# Patient Record
Sex: FEMALE | Race: WHITE | Employment: OTHER | URBAN - METROPOLITAN AREA
[De-identification: names, ages, dates, MRNs, and addresses within clinical notes are randomized per-mention and may not be internally consistent; named-entity substitution may affect disease eponyms.]

---

## 2019-05-05 ENCOUNTER — APPOINTMENT (OUTPATIENT)
Dept: GENERAL RADIOLOGY | Age: 84
DRG: 602 | End: 2019-05-05
Payer: MEDICARE

## 2019-05-05 ENCOUNTER — HOSPITAL ENCOUNTER (INPATIENT)
Age: 84
LOS: 3 days | Discharge: NURSING, REHAB OR LONG-TERM CARE | DRG: 602 | End: 2019-05-08
Attending: EMERGENCY MEDICINE | Admitting: INTERNAL MEDICINE
Payer: MEDICARE

## 2019-05-05 DIAGNOSIS — L03.115 CELLULITIS OF RIGHT LOWER EXTREMITY: Primary | ICD-10-CM

## 2019-05-05 DIAGNOSIS — L89.159 PRESSURE INJURY OF SKIN OF SACRAL REGION, UNSPECIFIED INJURY STAGE: ICD-10-CM

## 2019-05-05 PROBLEM — L03.90 CELLULITIS: Status: ACTIVE | Noted: 2019-05-05

## 2019-05-05 LAB
ANION GAP SERPL CALCULATED.3IONS-SCNC: 13 MMOL/L (ref 3–16)
BASOPHILS ABSOLUTE: 0 K/UL (ref 0–0.2)
BASOPHILS RELATIVE PERCENT: 0.3 %
BUN BLDV-MCNC: 25 MG/DL (ref 7–20)
C-REACTIVE PROTEIN: 68.2 MG/L (ref 0–5.1)
CALCIUM SERPL-MCNC: 8.8 MG/DL (ref 8.3–10.6)
CHLORIDE BLD-SCNC: 94 MMOL/L (ref 99–110)
CO2: 25 MMOL/L (ref 21–32)
CREAT SERPL-MCNC: 0.6 MG/DL (ref 0.6–1.2)
EOSINOPHILS ABSOLUTE: 0 K/UL (ref 0–0.6)
EOSINOPHILS RELATIVE PERCENT: 0.5 %
GFR AFRICAN AMERICAN: >60
GFR NON-AFRICAN AMERICAN: >60
GLUCOSE BLD-MCNC: 117 MG/DL (ref 70–99)
HCT VFR BLD CALC: 41.8 % (ref 36–48)
HEMOGLOBIN: 13.7 G/DL (ref 12–16)
LYMPHOCYTES ABSOLUTE: 0.7 K/UL (ref 1–5.1)
LYMPHOCYTES RELATIVE PERCENT: 7.8 %
MCH RBC QN AUTO: 30.5 PG (ref 26–34)
MCHC RBC AUTO-ENTMCNC: 32.7 G/DL (ref 31–36)
MCV RBC AUTO: 93.2 FL (ref 80–100)
MONOCYTES ABSOLUTE: 0.8 K/UL (ref 0–1.3)
MONOCYTES RELATIVE PERCENT: 8.7 %
NEUTROPHILS ABSOLUTE: 7.4 K/UL (ref 1.7–7.7)
NEUTROPHILS RELATIVE PERCENT: 82.7 %
PDW BLD-RTO: 14 % (ref 12.4–15.4)
PLATELET # BLD: 277 K/UL (ref 135–450)
PMV BLD AUTO: 8.6 FL (ref 5–10.5)
POTASSIUM REFLEX MAGNESIUM: 4.8 MMOL/L (ref 3.5–5.1)
RBC # BLD: 4.49 M/UL (ref 4–5.2)
SEDIMENTATION RATE, ERYTHROCYTE: 44 MM/HR (ref 0–30)
SODIUM BLD-SCNC: 132 MMOL/L (ref 136–145)
WBC # BLD: 9 K/UL (ref 4–11)

## 2019-05-05 PROCEDURE — 96366 THER/PROPH/DIAG IV INF ADDON: CPT

## 2019-05-05 PROCEDURE — 6360000002 HC RX W HCPCS: Performed by: PHYSICIAN ASSISTANT

## 2019-05-05 PROCEDURE — 99284 EMERGENCY DEPT VISIT MOD MDM: CPT

## 2019-05-05 PROCEDURE — 73590 X-RAY EXAM OF LOWER LEG: CPT

## 2019-05-05 PROCEDURE — 86140 C-REACTIVE PROTEIN: CPT

## 2019-05-05 PROCEDURE — 2580000003 HC RX 258: Performed by: PHYSICIAN ASSISTANT

## 2019-05-05 PROCEDURE — 85025 COMPLETE CBC W/AUTO DIFF WBC: CPT

## 2019-05-05 PROCEDURE — 87040 BLOOD CULTURE FOR BACTERIA: CPT

## 2019-05-05 PROCEDURE — 85652 RBC SED RATE AUTOMATED: CPT

## 2019-05-05 PROCEDURE — 2580000003 HC RX 258: Performed by: INTERNAL MEDICINE

## 2019-05-05 PROCEDURE — 1200000000 HC SEMI PRIVATE

## 2019-05-05 PROCEDURE — 96365 THER/PROPH/DIAG IV INF INIT: CPT

## 2019-05-05 PROCEDURE — 80048 BASIC METABOLIC PNL TOTAL CA: CPT

## 2019-05-05 PROCEDURE — 73650 X-RAY EXAM OF HEEL: CPT

## 2019-05-05 RX ORDER — ACETAMINOPHEN 325 MG/1
650 TABLET ORAL EVERY 4 HOURS PRN
Status: DISCONTINUED | OUTPATIENT
Start: 2019-05-05 | End: 2019-05-08 | Stop reason: HOSPADM

## 2019-05-05 RX ORDER — DOXAZOSIN MESYLATE 4 MG/1
4 TABLET ORAL NIGHTLY
Status: DISCONTINUED | OUTPATIENT
Start: 2019-05-05 | End: 2019-05-08 | Stop reason: HOSPADM

## 2019-05-05 RX ORDER — SODIUM CHLORIDE 9 MG/ML
INJECTION, SOLUTION INTRAVENOUS CONTINUOUS
Status: ACTIVE | OUTPATIENT
Start: 2019-05-05 | End: 2019-05-06

## 2019-05-05 RX ORDER — SODIUM CHLORIDE 0.9 % (FLUSH) 0.9 %
10 SYRINGE (ML) INJECTION EVERY 12 HOURS SCHEDULED
Status: DISCONTINUED | OUTPATIENT
Start: 2019-05-05 | End: 2019-05-08 | Stop reason: HOSPADM

## 2019-05-05 RX ORDER — ONDANSETRON 2 MG/ML
4 INJECTION INTRAMUSCULAR; INTRAVENOUS EVERY 6 HOURS PRN
Status: DISCONTINUED | OUTPATIENT
Start: 2019-05-05 | End: 2019-05-08 | Stop reason: HOSPADM

## 2019-05-05 RX ORDER — AMLODIPINE BESYLATE 5 MG/1
5 TABLET ORAL DAILY
Status: DISCONTINUED | OUTPATIENT
Start: 2019-05-06 | End: 2019-05-08 | Stop reason: HOSPADM

## 2019-05-05 RX ORDER — SODIUM CHLORIDE 0.9 % (FLUSH) 0.9 %
10 SYRINGE (ML) INJECTION PRN
Status: DISCONTINUED | OUTPATIENT
Start: 2019-05-05 | End: 2019-05-08 | Stop reason: HOSPADM

## 2019-05-05 RX ADMIN — SODIUM CHLORIDE: 9 INJECTION, SOLUTION INTRAVENOUS at 23:56

## 2019-05-05 RX ADMIN — Medication 10 ML: at 23:56

## 2019-05-05 RX ADMIN — DEXTROSE MONOHYDRATE 1 G: 5 INJECTION INTRAVENOUS at 20:30

## 2019-05-05 ASSESSMENT — PAIN DESCRIPTION - ORIENTATION: ORIENTATION: POSTERIOR

## 2019-05-05 ASSESSMENT — PAIN SCALES - GENERAL: PAINLEVEL_OUTOF10: 5

## 2019-05-05 ASSESSMENT — PAIN DESCRIPTION - LOCATION: LOCATION: COCCYX

## 2019-05-05 ASSESSMENT — PAIN DESCRIPTION - ONSET: ONSET: PROGRESSIVE

## 2019-05-05 ASSESSMENT — PAIN - FUNCTIONAL ASSESSMENT: PAIN_FUNCTIONAL_ASSESSMENT: PREVENTS OR INTERFERES WITH ALL ACTIVE AND SOME PASSIVE ACTIVITIES

## 2019-05-05 ASSESSMENT — PAIN DESCRIPTION - FREQUENCY: FREQUENCY: CONTINUOUS

## 2019-05-05 ASSESSMENT — PAIN DESCRIPTION - DESCRIPTORS: DESCRIPTORS: CONSTANT

## 2019-05-05 ASSESSMENT — PAIN DESCRIPTION - PROGRESSION: CLINICAL_PROGRESSION: GRADUALLY WORSENING

## 2019-05-05 ASSESSMENT — PAIN SCALES - WONG BAKER: WONGBAKER_NUMERICALRESPONSE: 4

## 2019-05-05 ASSESSMENT — PAIN DESCRIPTION - PAIN TYPE: TYPE: ACUTE PAIN

## 2019-05-05 NOTE — ED NOTES
Mint Green, Lavender, Yellow/Orange blood tubes and one set of blood cultures collected and sent to lab.         Victoriano Cristina RN  05/05/19 1940

## 2019-05-05 NOTE — ED NOTES
PT IS BEDRIDDEN AT [facility]. DAUGHTER NOTICED  TODAY THAT PT'S PRESSURE ULCERS HAVE BEEN GETTING WORSE. SORES NOTED TO BILATERAL FEET AND LEGS AND COCCYX.       Jana Fritz RN  05/05/19 [id number]

## 2019-05-05 NOTE — ED PROVIDER NOTES
[address] [facility] [address] ENCOUNTER          PHYSICIAN ASSISTANT NOTE       Date of evaluation: 5/5/2019    Chief Complaint     Wound Check    History of Present Illness     Karmen King is a 80 y.o. female who presents with chief complaint of wound check. Patient cannot provide much history at all, obtained via her daughter. Daughter reports that the patient has been completely bedridden for many years and she has been taking care of all of the wounds the patient has been developing. Pressure points have become increasingly worse as of late because the patient cannot move herself whatsoever. Reports that she has been using calamine lotion on the areas and wrapping them up carefully to attempt to avoid infection. States that 3 days ago the way into her buttocks, right ankle, and right lower leg have become increasingly more red and painful to the patient. There is spreading redness around all 3 of these areas. The daughter attempted to get the patient to come to the emergency department 3 days ago, although she declined. She was able to convince her to come to the emergency department today. Discussion was had with the daughter who states that she has been taking care of her mother for as long as she can remember. Recently, the daughters 2 children, both with bipolar disorder, as well as multiple grandchildren have recently moved in with her. She states that she has struggling to care for herself as well as all of the family members, including this patient. She states that she is not providing as good of care as she used to to this patient does not fear to her. She states that she feels as if this patient needs to be sitting quietly at this time in her life and she is not getting this at home anymore. She is willing to discuss nursing home options.       Review of Systems     Review of Systems   Unable to perform ROS: Other     - does not speak in full sentences to me - says nikolay when moving legs only    Past Medical, Surgical, Family, and Social History     She has a past medical history of Cancer ([address] Hypertension, and Osteoarthritis. She has a past surgical history that includes Hysterectomy; fracture surgery; and Upper gastrointestinal endoscopy (3/8/11). Her family history is not on file. She reports that she has never smoked. She has never used smokeless tobacco. She reports that she does not drink alcohol or use drugs. Medications     Previous Medications    AMLODIPINE (NORVASC) 5 MG TABLET    Take 5 mg by mouth daily    DOXAZOSIN (CARDURA) 4 MG TABLET    Take 4 mg by mouth nightly    LOPERAMIDE (IMODIUM) 2 MG CAPSULE    Take 2 mg by mouth 4 times daily as needed for Diarrhea       Allergies     She has No Known Allergies. Physical Exam     INITIAL VITALS: BP: 115/66,  , Pulse: 100, Resp: 18, SpO2: 99 %  Physical Exam   Constitutional:   Frail, elderly female   HENT:   Head: Normocephalic and atraumatic. Cheeks sunken in   Eyes: Pupils are equal, round, and reactive to light. Conjunctivae and EOM are normal.   Neck: Normal range of motion. Cardiovascular: Normal rate, regular rhythm and normal heart sounds. Exam reveals no friction rub. No murmur heard. Pulmonary/Chest: Effort normal and breath sounds normal. No stridor. No respiratory distress. Musculoskeletal:   Does not move extremities   Skin:   Likely stage III decubitus ulcer to the sacrum with surrounding erythema, no active drainage, no warmth; stage III ulceration to the left medial aspect of the tibia with surrounding erythema, mild drainage to bandage; skin ulceration to left heel with fluid collection and surrounding erythema; multiple other areas of pressure sores without current breakdown of the skin along all pressure points on the body   Nursing note and vitals reviewed.       Diagnostic Results     EKG   none    RADIOLOGY:  XR TIBIA FIBULA RIGHT (2 VIEWS)   Final Result      No acute fracture but diffuse Allergies, and Family Hx were reviewed. The patient was given the following medications:  Orders Placed This Encounter   Medications    ceFAZolin (ANCEF) 1 g in dextrose 5 % 50 mL IVPB (mini-bag)       CONSULTS:  Martha Cintron / JOSELUIS / Jones Tamar is a 80 y.o. female presenting with her daughter for concern of bed sores. Patient's coworkers from pressure areas and increasing over the past year. Daughter reports that 1 year ago he did have wound care coming to see thumb as well as home health nurse, although they no longer come. She has not seen her primary care physician in quite some time, since early 2018. The daughter has been trying to care for the patient's wounds at home, although at this time believes that she has no longer able to care for them on her own without help. On exam, this is a very frail appearing female. She has multiple bandages all over her body for pressure wounds. There are three main areas of concerning wounds. One area is located to the sacrum, likely stage III decubitus ulcer. There is surrounding erythema, no active drainage, no warmth noted. Another area of concern is located to the right medial tibia. This is also likely stage III ulceration with surrounding erythema, mild drainage on the bandage, and slight warmth to it. There is another area located to the right heel with a fluid collection, no surrounding erythema on this area. Due to concern for developing cellulitis, IV access established laboratory analysis was obtained. CBC without leukocytosis or anemia. BMP with sodium of 132, not over like joint abnormalities. ESR pending at this time. CRP elevated to 60.2. One set of blood cultures obtained, likely not septic at this time. X-rays were obtained of the tibia calcaneus for concern of developing osteomyelitis.   These revealed severe demineralization as well as muscular atrophy, no concerning findings for osteomyelitis at this time. IV antibiotics were initiated for concern of developing cellulitis secondary to multiple skin ulcers. Due to patient's frail state of health, multiple pressure wounds, and concern for cellulitis, she will require admission for further workup and likely nursing home placement/palliative care/home health. This patient was also evaluated by the attending physician. All care plans were discussed and agreed upon. Clinical Impression     1. Cellulitis of right lower extremity    2. Pressure injury of skin of sacral region, unspecified injury stage        Disposition     PATIENT REFERRED TO:  No follow-up provider specified.     DISCHARGE MEDICATIONS:  New Prescriptions    No medications on file       DISPOSITION Decision To Admit 05/05/2019 08:31:17 PM        Jasper Messinama  05/05/19 2037

## 2019-05-06 PROBLEM — E43 SEVERE MALNUTRITION (HCC): Chronic | Status: ACTIVE | Noted: 2019-05-06

## 2019-05-06 PROBLEM — E43 SEVERE MALNUTRITION (HCC): Status: ACTIVE | Noted: 2019-05-06

## 2019-05-06 LAB
ANION GAP SERPL CALCULATED.3IONS-SCNC: 11 MMOL/L (ref 3–16)
BASOPHILS ABSOLUTE: 0 K/UL (ref 0–0.2)
BASOPHILS RELATIVE PERCENT: 0.5 %
BUN BLDV-MCNC: 18 MG/DL (ref 7–20)
CALCIUM SERPL-MCNC: 8.6 MG/DL (ref 8.3–10.6)
CHLORIDE BLD-SCNC: 97 MMOL/L (ref 99–110)
CO2: 25 MMOL/L (ref 21–32)
CREAT SERPL-MCNC: <0.5 MG/DL (ref 0.6–1.2)
EOSINOPHILS ABSOLUTE: 0.1 K/UL (ref 0–0.6)
EOSINOPHILS RELATIVE PERCENT: 1.2 %
GFR AFRICAN AMERICAN: >60
GFR NON-AFRICAN AMERICAN: >60
GLUCOSE BLD-MCNC: 107 MG/DL (ref 70–99)
HCT VFR BLD CALC: 33.1 % (ref 36–48)
HEMOGLOBIN: 10.8 G/DL (ref 12–16)
LYMPHOCYTES ABSOLUTE: 0.7 K/UL (ref 1–5.1)
LYMPHOCYTES RELATIVE PERCENT: 8.5 %
MCH RBC QN AUTO: 30 PG (ref 26–34)
MCHC RBC AUTO-ENTMCNC: 32.6 G/DL (ref 31–36)
MCV RBC AUTO: 91.9 FL (ref 80–100)
MONOCYTES ABSOLUTE: 0.9 K/UL (ref 0–1.3)
MONOCYTES RELATIVE PERCENT: 10.1 %
NEUTROPHILS ABSOLUTE: 6.7 K/UL (ref 1.7–7.7)
NEUTROPHILS RELATIVE PERCENT: 79.7 %
PDW BLD-RTO: 13.8 % (ref 12.4–15.4)
PLATELET # BLD: 333 K/UL (ref 135–450)
PMV BLD AUTO: 8.8 FL (ref 5–10.5)
POTASSIUM REFLEX MAGNESIUM: 4.3 MMOL/L (ref 3.5–5.1)
RBC # BLD: 3.6 M/UL (ref 4–5.2)
SODIUM BLD-SCNC: 133 MMOL/L (ref 136–145)
WBC # BLD: 8.4 K/UL (ref 4–11)

## 2019-05-06 PROCEDURE — 6370000000 HC RX 637 (ALT 250 FOR IP): Performed by: INTERNAL MEDICINE

## 2019-05-06 PROCEDURE — 80048 BASIC METABOLIC PNL TOTAL CA: CPT

## 2019-05-06 PROCEDURE — 1200000000 HC SEMI PRIVATE

## 2019-05-06 PROCEDURE — 6360000002 HC RX W HCPCS: Performed by: INTERNAL MEDICINE

## 2019-05-06 PROCEDURE — 36415 COLL VENOUS BLD VENIPUNCTURE: CPT

## 2019-05-06 PROCEDURE — 92610 EVALUATE SWALLOWING FUNCTION: CPT

## 2019-05-06 PROCEDURE — 85025 COMPLETE CBC W/AUTO DIFF WBC: CPT

## 2019-05-06 PROCEDURE — 6370000000 HC RX 637 (ALT 250 FOR IP): Performed by: FAMILY MEDICINE

## 2019-05-06 PROCEDURE — 96366 THER/PROPH/DIAG IV INF ADDON: CPT

## 2019-05-06 PROCEDURE — 2580000003 HC RX 258: Performed by: INTERNAL MEDICINE

## 2019-05-06 RX ORDER — CASTOR OIL AND BALSAM, PERU 788; 87 MG/G; MG/G
OINTMENT TOPICAL 2 TIMES DAILY
Status: DISCONTINUED | OUTPATIENT
Start: 2019-05-06 | End: 2019-05-08 | Stop reason: HOSPADM

## 2019-05-06 RX ORDER — CHOLECALCIFEROL (VITAMIN D3) 125 MCG
5 CAPSULE ORAL DAILY
COMMUNITY

## 2019-05-06 RX ADMIN — CEFAZOLIN SODIUM 1 G: 1 POWDER, FOR SOLUTION INTRAMUSCULAR; INTRAVENOUS at 11:40

## 2019-05-06 RX ADMIN — DOXAZOSIN 4 MG: 4 TABLET ORAL at 20:38

## 2019-05-06 RX ADMIN — ENOXAPARIN SODIUM 30 MG: 30 INJECTION SUBCUTANEOUS at 08:49

## 2019-05-06 RX ADMIN — CEFAZOLIN SODIUM 1 G: 1 POWDER, FOR SOLUTION INTRAMUSCULAR; INTRAVENOUS at 20:38

## 2019-05-06 RX ADMIN — AMLODIPINE BESYLATE 5 MG: 5 TABLET ORAL at 08:49

## 2019-05-06 RX ADMIN — DOXAZOSIN 4 MG: 4 TABLET ORAL at 00:55

## 2019-05-06 RX ADMIN — CASTOR OIL AND BALSAM, PERU: 788; 87 OINTMENT TOPICAL at 20:38

## 2019-05-06 ASSESSMENT — PAIN SCALES - GENERAL
PAINLEVEL_OUTOF10: 0

## 2019-05-06 NOTE — CONSULTS
[facility]  Palliative Medicine Consultation Note      Date Of Admission:5/5/2019  Date of consult: 05/06/19  Seen by [facility] in the past:  No    Recommendations:      Discussed the patient with POA over the phone. The patient has been bed ridden for years and family has been having with taking care of her especially now that the POA has to take care of her Bipolar grandchildren  that are living with her now. POA would like for the patient to receive physical therapy in hopes to make her stronger and even ambulatory again as well as having her wound healed. POA would like to continue to have the patient be a Full Code as that was the wishes of her mother to have everything done even though the POA understands that having resuscitation measures may not have a good outcome. Informed the POA that if she does not improve after physical therapy it might be worth having additional help through [place] [facility] or outpatient palliative services. We also introduced the Idea of hospice that if she continues to decline she would likely qualify with hospice and could get some extra help at home. 1. Goals of Care/Advanced Care planning/Code status: Full  2. Pain:no pain at this time  3. SOB:No SOB   Disposition:SNF    Reason for Consult:         __x__ Goals of Care  __x__ Code Status Discussion/Advanced Care Planning   __x__ Psychosocial/Family Support  __x__ Symptom Management  _____ Other (Specify)    Requesting Physician: Dr. Tyron Avila:  Skin ulcers    History Obtained From:  family member - Daughter    History of Present Illness:       Patient is a 81 y/o female who present to the ED for multiple wounds on her body. Patient has been bed ridden for a while and has been taken care of by her daughter. Family has been trying to take  Care of fran skin break down and has improved for a while but has now seemed to have worsen, Patient in the morning did not have any complaints but is atraumatic  Nose: Nares normal. Septum midline. Mucosa normal. No drainage or sinus tenderness. Lungs: clear to auscultation bilaterally  Heart: regular rate and rhythm, S1, S2 normal, no murmur, click, rub or gallop  Extremities: extremities normal, atraumatic, no cyanosis or edema  Skin: multiple decubiltal ulcers  Neurologic: A&Ox1    Palliative Performance Scale:  60% ? Ambulation reduced; Significant disease; Can't do hobbies/housework; intake normal   or reduced; occasional assist; LOC full/confusion  50% ? Mainly sit/lie; Extensive disease; Can't do any work; Considerable assist; intake normal  Or reduced; LOC full/confusion  40% ? Mainly in bed; Extensive disease; Mainly assist; intake normal or reduced; occasional assist; LOC full/confusion  30% ? Bed Bound; Extensive disease; Total care; intake reduced; LOC full/confusion  20% ? Bed Bound; Extensive disease; Total care; intake minimal; Drowsy/coma  10% ? Bed Bound; Extensive disease; Total care; Mouth care only; Drowsy/coma  0 ?  Death    PPS: 30%    Vitals:    /73   Pulse 85   Temp 97.6 °F (36.4 °C) (Oral)   Resp 16   Ht 5' 6\" (1.676 m)   Wt 99 lb 10.4 oz (45.2 kg)   SpO2 94%   BMI 16.08 kg/m²     DATA:    CBC with Differential:    Lab Results   Component Value Date    WBC 8.4 05/06/2019    RBC 3.60 05/06/2019    HGB 10.8 05/06/2019    HCT 33.1 05/06/2019     05/06/2019    MCV 91.9 05/06/2019    MCH 30.0 05/06/2019    MCHC 32.6 05/06/2019    RDW 13.8 05/06/2019    SEGSPCT 68.1 05/30/2012    LYMPHOPCT 8.5 05/06/2019    MONOPCT 10.1 05/06/2019    EOSPCT 3.3 10/11/2011    BASOPCT 0.5 05/06/2019    MONOSABS 0.9 05/06/2019    LYMPHSABS 0.7 05/06/2019    EOSABS 0.1 05/06/2019    BASOSABS 0.0 05/06/2019    DIFFTYPE Auto-K 05/30/2012     BMP:    Lab Results   Component Value Date     05/06/2019    K 4.3 05/06/2019    CL 97 05/06/2019    CO2 25 05/06/2019    BUN 18 05/06/2019    LABALBU 4.1 05/30/2012    CREATININE <0.5 05/06/2019 CALCIUM 8.6 05/06/2019    GFRAA >60 05/06/2019    GFRAA >60 07/17/2012    LABGLOM >60 05/06/2019    GLUCOSE 107 05/06/2019         Conclusion/Time spent:         Recommendations see above    Time spent with patient and/or family: 30  Time reviewing records: 10  Time communicating with staff: 5     A total of 45 minutes spent with the patient and family on unit greater than 50% in counseling regarding palliative care and in goals of care for the patient. Thank you to Dr. Beatrice Pathak for this consultation. We will continue to follow Ms. Scott's care as needed.

## 2019-05-06 NOTE — PLAN OF CARE
Problem: Risk for Impaired Skin Integrity  Goal: Tissue integrity - skin and mucous membranes  Description  Structural intactness and normal physiological function of skin and  mucous membranes. Outcome: Ongoing  Note:   Pt has multiple areas of skin breakdown. Mepilex in place to wounds. Assist with repositioning. Incontinence care provided. Pt on specialty mattress. Wound care consulted. Prevalon boots in place. Problem: Falls - Risk of:  Goal: Will remain free from falls  Description  Will remain free from falls  Outcome: Ongoing  Note:   Pt is a fall risk. See Jaylan Speed Fall Score. Pt bed is in low position, side rails up, call light and belongings are in reach. Bed alarm is on. Pt encouraged to call for assistance. Will continue with hourly rounds for po intake, pain needs, toileting and repositioning as needed.  Will continue to monitor for needs

## 2019-05-06 NOTE — CONSULTS
Nutrition Assessment    Type and Reason for Visit: Initial, Positive Nutrition Screen, Consult    Nutrition Recommendations:     1. Dysphagia 2 diet per SLP recommendations  2. Add Ensure Enlive BID to aid in meeting increased nutrient needs  3. Monitor nutritional adequacy and need for further intervention    Nutrition Assessment: Consult for failure to thrive. Patient is nutritionally compromised as she meets ASPEN criteria for severe PCM with severe muscle and fat wasting. Patient with further risk due to increased nutrient needs from multiple open wounds. SLP evaluated patient and recommended dysphagia 2 diet with thin liquids. Patient has had minimal intake, will order ONS and monitor nutritional status. Malnutrition Assessment:  · Malnutrition Status: Meets the criteria for severe malnutrition  · Context: Social or environmental circumstances(Family unable to provide adequate care)  · Findings of the 6 clinical characteristics of malnutrition (Minimum of 2 out of 6 clinical characteristics is required to make the diagnosis of moderate or severe Protein Calorie Malnutrition based on AND/ASPEN Guidelines):  1. Energy Intake-Unable to assess,      2. Weight Loss-Unable to assess,    3. Fat Loss-Severe subcutaneous fat loss, Orbital, Fat overlying the ribs  4. Muscle Loss-Severe muscle mass loss, Temples (temporalis muscle)  5. Fluid Accumulation-Unable to assess,    6.   Strength-Not measured    Nutrition Risk Level: High    Nutrient Needs:  · Estimated Daily Total Kcal: 8112-8564(63-57)  · Estimated Daily Protein (g): 54-63(1.2-1.4)  · Estimated Daily Total Fluid (ml/day): 1575 ml    Nutrition Diagnosis:   · Problem: Severe malnutrition  · Etiology: related to Insufficient energy/nutrient consumption     Signs and symptoms:  as evidenced by Severe loss of subcutaneous fat, Severe muscle loss    Objective Information:  · Nutrition-Focused Physical Findings: No BM  · Wound Type: Multiple, Open Wounds  · Current Nutrition Therapies:  · Oral Diet Orders: Dysphagia 2   · Oral Diet intake: 1-25%  · Oral Nutrition Supplement (ONS) Orders: None  · Anthropometric Measures:  · Ht: 5' 6\" (167.6 cm)   · Current Body Wt: 99 lb (44.9 kg)  · Ideal Body Wt: 130 lb (59 kg),    · BMI Classification: BMI <18.5 Underweight(16)    Nutrition Interventions:   Continue current diet, Start ONS  Continued Inpatient Monitoring    Nutrition Evaluation:   · Evaluation: Goals set   · Goals: Patient will tolerate and consume 75% or greater of all meals and supplements    · Monitoring: Meal Intake, Supplement Intake, Diet Tolerance, Pertinent Labs, Wound Healing      Electronically signed by Jayjay Osman RD, LD on 5/6/19 at 2:49 PM    Contact Number: [phone]

## 2019-05-06 NOTE — H&P
Hospital Medicine History & Physical      PCP: Joanne Huerta DO    Date of Admission: 5/5/2019    Date of Service: Pt seen/examined on 5/5/2019 and Admitted to Inpatient with expected LOS greater than two midnights due to medical therapy. Chief Complaint:  Multiple wounds, some may be infected per daughter      History Of Present Illness:    80 y.o. female who presents with her daughter, who is the caregiver, with multiple wounds. Pt is essentially bed ridden and has not seen a doctor for many years. Hx taken from the daughter at the bed side who her self is under lot of stress due to her Kalani Rodriguez' issues. Daughter feels the care she is giving her mother is not efficient enough and feels that she cannot continue to do it due to extreme stresses in life. Pt has not spiked any fever, chills. Patient cannot provide much history at all, obtained via her daughter. Daughter reports that the patient has been completely bedridden for many years and she has been taking care of all of the wounds the patient has been developing. Pressure points have become increasingly worse and patient is unable to move herself whatsoever. Reports that she has been using calamine lotion on the areas and wrapping them up carefully to attempt to avoid infection. States that 3 days ago she noticed that her buttocks, right ankle, and right lower leg have become increasingly more red and painful to the patient. There is spreading redness around all 3 of these areas. The daughter attempted to get the patient to come to the emergency department 3 days ago, although she declined. She was able to convince her to come to the emergency department today. Patient's daughter has been taking care of her mother for as long as she can remember. Recently, the daughters 2 children, both with bipolar disorder, as well as multiple grandchildren have recently moved in with her.   She states that she has struggling to care for herself as well as all of the family members, including this patient. She states that she is not providing a good care as she used to do for her mother now due to issues at home. Daughter is willing to discuss nursing home options. Past Medical History:          Diagnosis Date    Cancer ([address]     skin     Hypertension     Osteoarthritis        Past Surgical History:          Procedure Laterality Date    FRACTURE SURGERY      right hip one week post op    HYSTERECTOMY      UPPER GASTROINTESTINAL ENDOSCOPY  3/8/11       Medications Prior to Admission:      Prior to Admission medications    Medication Sig Start Date End Date Taking? Authorizing Provider   amLODIPine (NORVASC) 5 MG tablet Take 5 mg by mouth daily   Yes Historical Provider, MD   doxazosin (CARDURA) 4 MG tablet Take 4 mg by mouth nightly   Yes Historical Provider, MD   loperamide (IMODIUM) 2 MG capsule Take 2 mg by mouth 4 times daily as needed for Diarrhea    Historical Provider, MD       Allergies:  Patient has no known allergies. Social History:    TOBACCO:   reports that she has never smoked. She has never used smokeless tobacco.  ETOH:   reports that she does not drink alcohol. Family History:    Reviewed in detail and negative for DM, CAD, Cancer, CVA. Positive as follows:    History reviewed. No pertinent family history. REVIEW OF SYSTEMS:   Pertinent positives as noted in the HPI. All other systems reviewed and negative. PHYSICAL EXAM PERFORMED:    BP (!) 118/58   Pulse 100   Resp 18   SpO2 95%     General appearance:  No apparent distress, appears stated age and cooperative. Cachectic with prominent bony skeleton  HEENT:  Normal cephalic, atraumatic without obvious deformity. Pupils equal, round, and reactive to light. Extra ocular muscles intact. Conjunctivae/corneas clear. Neck: Supple, with full range of motion. No jugular venous distention. Trachea midline. Respiratory:  Normal respiratory effort.  Clear to auscultation, No acute fracture but diffuse demineralization throughout the right tibia and fibula is severe muscular atrophy      No acute fracture involving the calcaneus with enthesopathic calcaneal spurring and demineralization. Degenerative changes. ASSESSMENT:    Active Hospital Problems    Diagnosis Date Noted    Cellulitis [L03.90] 05/05/2019   Multiple decubitus ulcer with surrounding erythema  Bed ridden state for many years  Severe protein calorie malnutrition  HTN    PLAN:  Continue on IV ABX  Skin and wound care  Wound care consult  Nutritionist consult  Gentle IV hydration  Continue home BP medications  PT/OT  Case management and SS consult  Palliative care consult    DVT Prophylaxis: Lovenox  Diet: DIET GENERAL;  Code Status: Full Code    PT/OT Eval Status: Yes    Dispo - SNF/ Marvel Oliver MD    Thank you Brad Bello DO for the opportunity to be involved in this patient's care. If you have any questions or concerns please feel free to contact me at [phone].

## 2019-05-06 NOTE — PROGRESS NOTES
Pt's IV was out of hand when this nurse entered room. This is the second IV pt lost this morning. Lab was unable to draw labs and sending another phlebotomist to attempt. Franck served Dr. Pippa Castillo making aware and asking for possible central line, awaiting response.

## 2019-05-06 NOTE — PLAN OF CARE
Nutrition Problem: Severe malnutrition  Intervention: Food and/or Nutrient Delivery: Continue current diet, Start ONS  Nutritional Goals: Patient will tolerate and consume 75% or greater of all meals and supplements

## 2019-05-06 NOTE — PROGRESS NOTES
[address] [id number] Continence Nurse  Follow-up Progress Note       NAME:  Jassi Sykes RECORD NUMBER:  [id number]  AGE:  80 y.o. GENDER:  female  :  3/31/1927  TODAY'S DATE:  2019    Subjective:   Wound Identification: hips, feet, buttocks, coccyx, ankles, ankles, BLE, heels  Wound Type:pressure  Contributing Factors: cachectic, anorexia, fragile elderly advancing age, deconditioning, bedrideen/unable to reposition - resists repositioning due to chronic pain, dementia        Patient Goal of Care:  [x] Wound Healing  [] Odor Control  [x] Palliative Care  [] Pain Control   [] Other:     Objective:    BP (!) 111/51   Pulse 87   Temp 97.5 °F (36.4 °C) (Oral)   Resp 14   Ht 5' 6\" (1.676 m)   Wt 99 lb 10.4 oz (45.2 kg)   SpO2 93%   BMI 16.08 kg/m²   Anthony Risk Score: Nathony Scale Score: 10  Assessment:   Measurements:  Decubitus Ulcer 11 Buttocks Right Stage I Red open to air  [place] (Active)   Number of days: 2985       Decubitus Ulcer 11 Right;Posterior Stage II red moist edges defined clean with NSS Mepilex applied (Active)   Number of days: 2985       Pressure Ulcer 19 Buttocks Left DTI (Active)   Wound Length (cm) 1.5 cm 2019 11:30 PM   Wound Width (cm) 1.5 cm 2019 11:30 PM   Calculated Wound Size (cm^2) (l*w) 2.25 cm^2 2019 11:30 PM   Dressing Status Clean;Dry; Intact 2019  3:18 PM   Dressing/Treatment Foam 2019  3:18 PM   Number of days: 0       Wound 11  Other (Comment) Right right heel open area measured per Aimee Figueroa RN (Active)   Number of days: 2934       Incision 11 Hip Right (Active)   Number of days: 2980       Wound 19 Hip Lateral DTI (Active)   Wound Deep tissue/Injury 2019  3:18 PM   Wound Length (cm) 2 cm 2019 11:30 PM   Wound Width (cm) 4 cm 2019 11:30 PM   Wound Surface Area (cm^2) 8 cm^2 2019 11:30 PM   Number of days: 0       Wound 19 Hip Lateral DTI (Active) Surface Area (cm^2) 90 cm^2 5/5/2019 11:30 PM   Drainage Amount None 5/6/2019  5:00 AM   Number of days: 0       Wound 05/06/19 Ankle Right;Lateral DTI (Active)   Wound Deep tissue/Injury 5/6/2019  3:18 PM   Number of days: 0       Wound 05/05/19 Knee Lateral;Left (Active)   Wound Deep tissue/Injury 5/6/2019  3:18 PM   Dressing Status Changed 5/6/2019  5:00 AM   Number of days: 0       Wound 05/05/19 Ankle Right;Lateral (Active)   Wound Deep tissue/Injury 5/6/2019  3:18 PM   Dressing Status Changed 5/6/2019  5:00 AM   Number of days: 0       Wound 05/05/19 Knee Inner;Right (Active)   Wound Deep tissue/Injury 5/6/2019  3:18 PM   Dressing Status Changed 5/6/2019  5:00 AM   Number of days: 0       Wound 05/05/19 Leg Inner;Right (Active)   Wound Deep tissue/Injury 5/6/2019  3:18 PM   Dressing Status Changed 5/6/2019  5:00 AM   Drainage Amount Small 5/6/2019  5:00 AM   Drainage Description Serosanguinous 5/6/2019  5:00 AM   Number of days: 0       Wound 05/06/19 Coccyx DTI (Active)   Wound Deep tissue/Injury 5/6/2019  3:18 PM   Number of days: 0       Response to treatment: \"ow\" with every movement    Plan:   Plan of Care:  Venelex to all DTI pressure injuries. Firm rubber covered wires twisted in hair to curl, ~20, all removed for pressure concerns, placed with pt. belongings in bag.     Specialty Bed Required : yes  [] Low Air Loss   [x] Pressure Redistribution  [] Fluid Immersion  [] Bariatric  [] Total Pressure Relief  [] Other:     Current Diet: DIET GENERAL; Dysphagia II Mechanically Altered  Dietary Nutrition Supplements: Standard High Calorie Oral Supplement    Patient/Caregiver Teaching: pt.   Level of patient/caregiver understanding able to:   [] Indicates understanding       [] Needs reinforcement  [] Unsuccessful      [] Verbal Understanding  [] Demonstrated understanding       [x] No evidence of learning  [] Refused teaching         [] N/A       Electronically signed by Nory Cooley RN, Constantino & Chaim on 5/6/2019

## 2019-05-06 NOTE — ED NOTES
Report called to NAHUN Adams for ED4 going to 9704. All questions answered. Patient will be transported on ED stretcher.         Thania Rodas RN  05/05/19 4543

## 2019-05-06 NOTE — PROGRESS NOTES
([address]  Hypertension       Cellulitis appears to be primarily right lower extremity. There is some swelling noted to that lower extremity. We'll continue with antibiotics. Also encourage her to improve her diet to better improve her nutritional state. Patient will continue with Ancef. She is also on IV fluids at the moment.     Diet: DIET GENERAL; Dysphagia II Mechanically Altered  Dietary Nutrition Supplements: Standard High Calorie Oral Supplement  Code:Full Code  DVT PPX Lovenox    Luis Eduardo Stevens MD   5/6/2019 6:00 PM

## 2019-05-06 NOTE — PROGRESS NOTES
Speech Language Pathology  Facility/Department: [address] EVALUATION    NAME: Little Gaytan  : 3/31/1927  MRN: [id number]    ADMISSION DATE: 2019  ADMITTING DIAGNOSIS: has Essential hypertension; Low back pain; DVT (deep venous thrombosis) ([facility] [address] Basal cell carcinoma, arm; Weakness generalized; Urinary incontinence; Osteoarthritis; and Cellulitis on their problem list.  ONSET DATE: 19    Recent Chest Xray- not performed this visit     Date of Eval: 2019  Evaluating Therapist: Zoraida Stallworth    Current Diet level:  Current Diet : Regular  Current Liquid Diet : Thin      Primary Complaint  Patient Complaint: pt c/o pain when attempting to sit upright    Pain: pt c/o pain when attempting to sit upright     Reason for Referral  Little Lukas was referred for a bedside swallow evaluation to assess the efficiency of her swallow function, identify signs and symptoms of aspiration and make recommendations regarding safe dietary consistencies, effective compensatory strategies, and safe eating environment. Impression   Daughter present- reported pt has had difficulty with mastication, especially with meats. Reports pt consumes yogurt and smoothies. Daughter reported episode of coughing with water yesterday. Pt reclined while daughter feeding pt d/t pt c/o pain with sitting upright -educated to the importance of sitting upright for PO intake to decrease risk of aspiration. Was able to sit pt slightly more upright. Oral- ROM of oral muscles was [facility] [place]. mastication with sausage was significantly prolonged- needed to have pt expectorate material. Trial with pancake still slow, but more functional. Pt had no difficulty closing lips around the straw to consume juice. Pharyngeal-Pt demonstrated no s/s aspiration with any consistency presented. Pt able to initiate swallow without difficulty with laryngeal movement observed. Vocal quality remained clear throughout.   No coughing, throat clearing or choking observed with any consistency. Did not attempt 3oz water test due to concern for safety given pt's reclined position. Dysphagia Diagnosis: Moderate oral stage dysphagia  Dysphagia Outcome Severity Scale: Level 3: Moderate dysphagia- Total assisstance, supervision or strategies. Two or more diet consistencies restricted     Treatment Plan  Requires SLP Intervention: Yes  Duration/Frequency of Treatment: 2-4x  D/C Recommendations: To be determined       Recommended Diet and Intervention  Diet Solids Recommendation: Dysphagia II Mechanically Altered  Liquid Consistency Recommendation: Thin-Make NPO if s/s aspiration emerge and alert SLP  Pt requires 1:1 assistance with meals   Recommended Form of Meds: PO  Recommendations: Dysphagia treatment  Therapeutic Interventions: Diet tolerance monitoring;Patient/Family education    Compensatory Swallowing Strategies  Compensatory Swallowing Strategies: Assist feed;Eat/Feed slowly; Small bites/sips; Check for pocketing of food on the Left; Check for pocketing of food on the Right;Upright as possible for all oral intake    Treatment/Goals  1- The patient will tolerate recommended diet without observed clinical signs of aspiration    2- The pt/family will demonstrate understanding of swallowing recommendations and concerns. 5/6-  The pt and daughter were educated to purpose of the visit, , concerns for aspiration, swallowing strategies, diet recommendations, and rational for diet downgrade. The daughter stated comprehension. The pt had questionable comprehension. con't goal    General  Chart Reviewed: Yes  Behavior/Cognition: Alert; Cooperative  Respiratory Status: Room air  Communication Observation: Functional  Follows Directions: Simple  Dentition: Some missing teeth  Patient Positioning: Partially reclined  Baseline Vocal Quality: Normal  Volitional Cough: Weak  Prior Dysphagia History: Daughter present- reports pt has difficulty with mastication and coughed with water yesterday  Consistencies Administered: Soft solid; Thin - straw           Vision/Hearing  Vision  Vision: Within Functional Limits  Hearing  Hearing: Within functional limits    Oral Motor Deficits   ROM of oral muscles was [facility]     Oral Phase Dysfunction     mastication with sausage was significantly prolonged- needed to have pt expectorate material. Trial with pancake still slow, but more functional. Pt had no difficulty closing lips around the straw to consume juice. .        Indicators of Pharyngeal Phase Dysfunction     Pt demonstrated no s/s aspiration with any consistency presented. Pt able to initiate swallow without difficulty with laryngeal movement observed. Vocal quality remained clear throughout. No coughing, throat clearing or choking observed with any consistency. Did not attempt 3oz water test due to concern for safety given pt's reclined position    Prognosis  Prognosis  Prognosis for safe diet advancement: fair  Barriers to reach goals: age  Individuals consulted  Consulted and agree with results and recommendations: Patient; Family member;RN  Family member consulted: Daughter    Education  Patient Education: Role of SLP  Patient Education Response: Verbalizes understanding;Needs reinforcement  Safety Devices in place: Yes  Type of devices: Call light within reach         Therapy Time  SLP Individual Minutes  Time In: 6541  Time Out: 0820  Minutes: 25            Pt's goal:pt did not state       Plan:  Continue goals per POC  Recommended diet:downgrade to dysphagia II with thin liquids-Make NPO if s/s aspiration emerge and alert SLP  Pt requires 1:1 assistance for meals  Pt upright as much as possible for PO intake   Total treatment time:22  Pt's discharge plan:to go home   Discharge Plan: To be determined closer to discharge  Discussed with RNJosh  Needs within reach.        [facility] Diaz[facility]- [address] [facility]  Pg # [phone]  This document will serve as a discharge summary if pt discharge before next treatment   session

## 2019-05-06 NOTE — CARE COORDINATION
Case Management Daily Note:    Current Plan of Care: Multiple Wounds, possible infection. PT AM-PAC: Pending   Date:      OT AM-PAC: Pending   Date:      DME needs: Defer    Reportedly patient is bed ridden. Discharge Plan: From home with daughter, reportedly need's placement. Daughter seeking placement. Tentative Discharge Date: 5/9    Current Barriers to Discharge: non-medicaid patient, possible need of LTC. Multiple wounds; from home. Resources/Information given:       Case Management Notes: SW reviewed chart. 80year old female patient from home with daughter. Daughter was providing care, but reportedly is overwhelmed with children's issues. Daughter no longer feels she can meet her mother's care needs. Daughter wanting to discuss options for placement.          Delma Vega, MSW, LSW     [facility]   [phone]

## 2019-05-06 NOTE — PROGRESS NOTES
Admission: Patient received to room 6311 from ED. Patient admitted with Dx of Wounds/cellulitis. Patient A&Ox2 to to person and place upon arrival. Patient denies c/o upon arrival, unless being turned then c/o pain. Tele monitor applied, rate and rhythm verified with monitor reader- SA with 1st degree block. Admission assessment as charted. VSS. Patient oriented to room, staff, and call system. Educated on fall protocol and hourly rounding. Patient and family informed to utilize call light with any needs. Verbalized understanding. Skin assessment completed by 2 RNs, mepilex dressings placed to all areas of breakdown and wounds measured as charted. Specialty bed ordered- pt has prevalon boots and pillow support. Daughter Emir Skinner [employer]) at bedside. Swallow screen performed and patient passed. Pills crushed in applesauce. Will continue to monitor.

## 2019-05-07 PROCEDURE — 6370000000 HC RX 637 (ALT 250 FOR IP): Performed by: INTERNAL MEDICINE

## 2019-05-07 PROCEDURE — 97530 THERAPEUTIC ACTIVITIES: CPT

## 2019-05-07 PROCEDURE — 97163 PT EVAL HIGH COMPLEX 45 MIN: CPT

## 2019-05-07 PROCEDURE — 2580000003 HC RX 258: Performed by: INTERNAL MEDICINE

## 2019-05-07 PROCEDURE — 97535 SELF CARE MNGMENT TRAINING: CPT

## 2019-05-07 PROCEDURE — 92526 ORAL FUNCTION THERAPY: CPT

## 2019-05-07 PROCEDURE — 1200000000 HC SEMI PRIVATE

## 2019-05-07 PROCEDURE — 6360000002 HC RX W HCPCS: Performed by: INTERNAL MEDICINE

## 2019-05-07 PROCEDURE — 97166 OT EVAL MOD COMPLEX 45 MIN: CPT

## 2019-05-07 PROCEDURE — 6370000000 HC RX 637 (ALT 250 FOR IP): Performed by: FAMILY MEDICINE

## 2019-05-07 PROCEDURE — 97162 PT EVAL MOD COMPLEX 30 MIN: CPT

## 2019-05-07 RX ORDER — UREA 10 %
3 LOTION (ML) TOPICAL NIGHTLY PRN
Status: DISCONTINUED | OUTPATIENT
Start: 2019-05-07 | End: 2019-05-08 | Stop reason: HOSPADM

## 2019-05-07 RX ORDER — AMOXICILLIN AND CLAVULANATE POTASSIUM 500; 125 MG/1; MG/1
1 TABLET, FILM COATED ORAL EVERY 8 HOURS SCHEDULED
Status: DISCONTINUED | OUTPATIENT
Start: 2019-05-07 | End: 2019-05-08 | Stop reason: HOSPADM

## 2019-05-07 RX ADMIN — AMOXICILLIN AND CLAVULANATE POTASSIUM 1 TABLET: 500; 125 TABLET, FILM COATED ORAL at 15:10

## 2019-05-07 RX ADMIN — AMOXICILLIN AND CLAVULANATE POTASSIUM 1 TABLET: 500; 125 TABLET, FILM COATED ORAL at 22:01

## 2019-05-07 RX ADMIN — DOXAZOSIN 4 MG: 4 TABLET ORAL at 22:01

## 2019-05-07 RX ADMIN — AMLODIPINE BESYLATE 5 MG: 5 TABLET ORAL at 08:31

## 2019-05-07 RX ADMIN — Medication 10 ML: at 08:34

## 2019-05-07 RX ADMIN — CASTOR OIL AND BALSAM, PERU: 788; 87 OINTMENT TOPICAL at 08:35

## 2019-05-07 RX ADMIN — CASTOR OIL AND BALSAM, PERU: 788; 87 OINTMENT TOPICAL at 22:01

## 2019-05-07 RX ADMIN — ENOXAPARIN SODIUM 30 MG: 30 INJECTION SUBCUTANEOUS at 08:30

## 2019-05-07 RX ADMIN — CEFAZOLIN SODIUM 1 G: 1 POWDER, FOR SOLUTION INTRAMUSCULAR; INTRAVENOUS at 09:11

## 2019-05-07 ASSESSMENT — PAIN SCALES - GENERAL
PAINLEVEL_OUTOF10: 0

## 2019-05-07 NOTE — PROGRESS NOTES
Speech Language Pathology  Facility/Department: [address]  Dysphagia Daily Treatment Note/discharge    NAME: Ann Marie Vincent  : 3/31/1927  MRN: [id number]    Patient Diagnosis(es):   Patient Active Problem List    Diagnosis Date Noted    Weakness generalized 2011     Priority: Medium    DVT (deep venous thrombosis) ([facility] [address] 2011     Priority: Medium    Severe malnutrition ([facility] [address] 2019    Cellulitis 2019    Osteoarthritis 2013    Urinary incontinence 2011    Basal cell carcinoma, arm 2011    Essential hypertension 2010    Low back pain 2010     Allergies: No Known Allergies          Recent Chest Xray- not performed this visit         Chart reviewed. Medical Diagnosis:cellulitis  Treatment Diagnosis:dysphagia     BSE Impression 19  Daughter present- reported pt has had difficulty with mastication, especially with meats. Reports pt consumes yogurt and smoothies. Daughter reported episode of coughing with water yesterday. Pt reclined while daughter feeding pt d/t pt c/o pain with sitting upright -educated to the importance of sitting upright for PO intake to decrease risk of aspiration. Was able to sit pt slightly more upright. Oral- ROM of oral muscles was [facility] [place]. mastication with sausage was significantly prolonged- needed to have pt expectorate material. Trial with pancake still slow, but more functional. Pt had no difficulty closing lips around the straw to consume juice. Pharyngeal-Pt demonstrated no s/s aspiration with any consistency presented. Pt able to initiate swallow without difficulty with laryngeal movement observed. Vocal quality remained clear throughout. No coughing, throat clearing or choking observed with any consistency.  Did not attempt 3oz water test due to concern for safety given pt's reclined position      MBS results- not warranted     Pain: pt denied pain     Current Diet :     Treatment:  Pt seen bedside to address the following goals:  1- The patient will tolerate recommended diet without observed clinical signs of aspiration  5/7- goal met-  Lungs clear per chart. Pt alert, able to tolerate sitting upright this morning. Pt analyzed with ice chips, water by straw and small piece of cracker. Mastication was prolonged with cracker. No lingual residue after the swallow. Pt with one instance (of 5) of delayed cough following large sip of water. Voice clear, no throat clearing.      2- The pt/family will demonstrate understanding of swallowing recommendations and concerns. 5/6-  The pt and daughter were educated to purpose of the visit, , concerns for aspiration, swallowing strategies, diet recommendations, and rational for diet downgrade. The daughter stated comprehension. The pt had questionable comprehension. con't goal  5/7- goal partially met-  Daughter not present this date. Pt educated to purpose of the visit and swallowing strategies. Pt stated comprehension but would benefit from reinforcement. Strategies written on pt's board for staff. Patient/Family/Caregiver Education:  .see above     Compensatory Strategies:  Assist feed;Eat/Feed slowly; Small bites/sips; Check for pocketing of food on the Left; Check for pocketing of food on the Right;Upright as possible for all oral intake        Plan:Pt discharged from dysphagia services. Pt met all goals for therapy. 1:1 assist for meals   meds crushed in applesauce   Diet recommendations:Dysphagia II with thin liquids- single sips   DC recommendation:No further follow up needed unless s/s aspiration emerge, make pt NPO and re-refer. Treatment: 15  D/W nursing, Leigh  Needs met prior to leaving room, call button in reach.     Murtaza Khoury, [address]  Speech-Language Pathologist  Pager [phone]      If patient is discharged prior to next treatment, this note will serve as the discharge summary

## 2019-05-07 NOTE — PROGRESS NOTES
Physical Therapy    Facility/Department: [address]  Initial Assessment / Discharge    NAME: Karmen King  : 3/31/1927  MRN: [id number]    Date of Service: 2019    Discharge Recommendations:  Karmen King scored a 6/24 on the AM-PAC short mobility form. At this time, no further PT is recommended upon discharge due to pt at functional baseline. Recommend patient returns to prior setting with prior services. PT Equipment Recommendations  Equipment Needed: No    Assessment   Assessment: Pt admitted from home with cellulitis. Pt poor historian and unable to provide social history. Per chart, pt has been bed bound for years and cared for by daughter at home. Noted B foot drop and B knee flexion contractures, as well as severe muscle atrophy B LEs. Pt requiring dependent assist for bed mobility and Mod -CGA for sitting balance. Pt requesting to return to supine within 15 seconds of sitting on side of bed. No acute PT needs identified at this time. Recommend return home with 24hr care. If daughter unable to provide needed care for pt, pt will need long term care. If OOB is desired while in the hospital, recommend up to recliner chair with RN staff using vera lift. Decision Making: High Complexity  Patient Education: Role of PT. No learning noted. Barriers to Learning: Cognition  REQUIRES PT FOLLOW UP: No         Restrictions  Position Activity Restriction  Other position/activity restrictions: Up with assist     Vision/Hearing  Vision: Within Functional Limits  Hearing: Within functional limits       Subjective  Chart Reviewed: Yes  Additional Pertinent Hx: Pt admitted  with wounds. right Tib/Fib and calcaneal XR: (-); severe muscle atrophy. PMH:  CA, HTN, OA  Diagnosis: Cellulitis    Subjective  Subjective: Pt found supine in bed. Pt confused, but pleasant and agreeable for PT.    Pain Screening  Patient Currently in Pain: Yes(LE pain with mobility, not rated, RN aware)      Orientation  Orientation  Overall Orientation Status: Impaired  Orientation Level: Oriented to person(only)     Social/Functional History  Social/Functional History  Lives With: Daughter  Type of Home: House  Additional Comments: Pt poor historian and unable to provide social history. Per chart, pt has been bed bound for years and cared for by daughter at home. Objective  ROM  General AROM: Noted bilateral knee flexion contractures, bilateral foot drop. Strength  Strength RLE: (grossly <3/5 throughout LE)  Strength LLE: (grossly <3/5 throughout LE)    Bed mobility  Supine to Sit: Dependent/Total  Sit to Supine: Dependent/Total     Balance  Sitting - Static: Poor(Mod A initially, progresing to CGA on side of bed. Pt sat EOB x6 min total, UE support on bed rail.   Pt attempting to return to supine multiple times while sitting up)     Exercises  Knee Long Arc Quad: x5 BLE(pt performed while sitting on side of bed; pt only able to perfrom within a 10 degrees knee ROM on R side and 20 degrees knee ROM on L side)       Plan: Discharge acute PT    Safety Devices  Type of devices: Left in bed, Bed alarm in place, Call light within reach, Nurse notified(Prevalon boots in place)    AM-PAC Score  AM-PAC Inpatient Mobility Raw Score : 6  AM-PAC Inpatient T-Scale Score : 23.55  Mobility Inpatient CMS 0-100% Score: 100  Mobility Inpatient CMS G-Code Modifier : CN       Therapy Time   Individual Concurrent Group Co-treatment   Time In 0821         Time Out 0846         Minutes 25         Timed Code Treatment Minutes:  15  Total Treatment Minutes:  [address] , PT

## 2019-05-07 NOTE — PROGRESS NOTES
Occupational Therapy   Occupational Therapy Initial Assessment/Tx/Discharge Note  Date: 2019   Patient Name: Lorelei Wagner  MRN: [id number]     : 3/31/1927    Date of Service: 2019  Assessment: Per chart, pt has been bedbound for years at home. On eval, pt presents with LE contractures not compatible with standing/transferring. Pt is able to feed herself and perform light grooming but is otherwise dependent for ADLs. Pt had pain with bed mobility and sitting. Per chart, family no longer able to provide care for pt at home. Recommend d/c to a long term care facility. No acute OT needs identified. Discharge Recommendations: Lorelei Wagner scored a  on the -[facility] ADL Inpatient form. At this time, no further OT is recommended upon discharge. OT Equipment Recommendations  Equipment Needed: No    Assessment   Assessment: Per chart, pt has been bedbound for years at home. On eval, pt presents with LE contractures not compatible with standing/transferring. Pt is able to feed herself and perform light grooming but is otherwise dependent for ADLs. Pt had pain with bed mobility and sitting. Per chart, family no longer able to provide care for pt at home. Recommend d/c to a long term care facility. No acute OT needs identified. Decision Making: Medium Complexity  Patient Education: Role of OT, d/c planning, activity promotion - no learning demonstrated  No Skilled OT: At baseline function  REQUIRES OT FOLLOW UP: No  Activity Tolerance  Activity Tolerance: Patient limited by pain;Treatment limited secondary to decreased cognition  Safety Devices  Safety Devices in place: Yes  Type of devices: Call light within reach; Left in bed;Bed alarm in place;Nurse notified(pt unable to operate call button)               Restrictions  Position Activity Restriction  Other position/activity restrictions: Up with assist    Subjective   General  Chart Reviewed:  Yes  Additional Pertinent Hx: 80 y.o. F admitted  with worsening of multiple wounds. Family no longer able to care for pt adequately at home. PMHx includes skin CA, HTN, OA, hysterectomy  Family / Caregiver Present: No  Referring Practitioner: Aurora Alvarado  Diagnosis: cellulitis  Subjective  Subjective: Pt in bed on entry. Alert, cooperative overall but c/o pain with touch and movement. Pain Assessment  Pain Assessment: 0-10(unrated pain primarily to LEs with touch and movement, RN aware )    Social/Functional History  Social/Functional History  Lives With: Daughter  Type of Home: House  Additional Comments: Pt poor historian and unable to provide social history. Per chart, pt has been bed bound for years and cared for by daughter at home.       Objective        Orientation  Overall Orientation Status: Impaired  Orientation Level: Oriented to person;Disoriented to situation;Disoriented to time;Disoriented to place(oriented to name, but not age or birthday)     Balance  Sitting Balance: Minimal assistance(mod assist initially then CGA; EOB x6 min using bedrail for support)  Standing Balance: Unable to assess(comment)(secondary to pain and LE contractures; suspect dependent)  ADL  Feeding: Minimal assistance;Setup;Verbal cueing  Grooming: Setup;Supervision(to wipe mouth, but anticipate increased assist for overhead or more complex grooming)  LE Dressing: Dependent/Total  Toileting: Dependent/Total        Bed mobility  Scooting: Dependent/Total  Transfers  Transfer Comments: PAUL transfer secondary to LE contractures; recommend vera lift     Cognition  Overall Cognitive Status: Exceptions  Cognition Comment: alert, oriented only to name, answers questions and follows command inconsistently, impaired memory and insight        LUE Strength  Gross LUE Strength: Exceptions to [facility] [place]  LUE Strength Comment:  and elbow flex/ext 3+/5; MMT not completed at shoulders secondary to poor ROM  RUE Strength  Gross RUE Strength: Exceptions to [facility] [place]  RUE Strength Comment:  and elbow

## 2019-05-07 NOTE — PROGRESS NOTES
Palliative Care Chart Review  and Check in Note:     NAME:  Guy Toledo Date: 5/5/2019  Hospital Day:  Hospital Day: 3   Current Code status: Full Code    Palliative care is continuing to following Ms. Jaclyn Payton for symptom management,  and goals of care discussion as needed. Patient's chart reviewed today 5/7/19. The following are the currently established goals/code status, and Symptom management. Goals of care:  Phone call was made to POA to up date the patient's progress. Patient does not seem to be interacting with staff as much and is serverly deconditioned. Family would like to continue with Full aggressive care and is not interested in hospice services yet. POA would like to see how she responds with physical therapy before making that decision.  Will meet with ELDER tomorrow as she will be at the hospital from 9-11am wednesday        Code status: Full      Discharge plan: SNF placement

## 2019-05-07 NOTE — PROGRESS NOTES
[facility]    5/7/2019 3:56 PM        Name: Sarah Ellison . Admitted: 5/5/2019  Primary Care Provider: Noemi Montanez DO (Tel: [phone])      Subjective:      Patient is resting in bed. Patient can provide little to no details of her history. Patient was admitted with multiple wounds and concern for cellulitis. Her nutritional status is poor. Reviewed interval ancillary notes    Objective:  BP (!) 164/51   Pulse 99   Temp 98 °F (36.7 °C) (Oral)   Resp 18   Ht 5' 6\" (1.676 m)   Wt 99 lb 10.4 oz (45.2 kg)   SpO2 93%   BMI 16.08 kg/m²     Intake/Output Summary (Last 24 hours) at 5/7/2019 1556  Last data filed at 5/7/2019 1014  Gross per 24 hour   Intake 35 ml   Output --   Net 35 ml      Wt Readings from Last 3 Encounters:   05/06/19 99 lb 10.4 oz (45.2 kg)   07/17/12 120 lb (54.4 kg)   08/10/10 107 lb (48.5 kg)       General appearance:  Appears comfortable, no distress  Head:  Atraumatic normocephalic  Neck: supple  Cardiovascular: Regular rhythm, normal S1, S2. No murmur. tr edema in lower extremities  Respiratory: Not using accessory muscles. Good inspiratory effort. Clear to auscultation bilaterally, no wheeze or crackles. GI: Abdomen soft, no tenderness, not distended, normal bowel sounds  Musculoskeletal: Frail and weak. Minimal movement in the bed  Neurology: awake and alert, cooperative      Labs and Tests:  CBC:   Recent Labs     05/05/19  1938 05/06/19  1121   WBC 9.0 8.4   HGB 13.7 10.8*    333     BMP:    Recent Labs     05/05/19  1938 05/06/19  1121   * 133*   K 4.8 4.3   CL 94* 97*   CO2 25 25   BUN 25* 18   CREATININE 0.6 <0.5*   GLUCOSE 117* 107*     Hepatic: No results for input(s): AST, ALT, ALB, BILITOT, ALKPHOS in the last 72 hours. Assessment & Plan:    Active Problems:    Cellulitis    Severe malnutrition (HCC)  Hypertension       Cellulitis appears to

## 2019-05-07 NOTE — PLAN OF CARE
Problem: Falls - Risk of:  Goal: Will remain free from falls  Description  Will remain free from falls  5/7/2019 1525 by Ladan Leonard RN  Outcome: Ongoing  Note:   Patient is bed bound at baseline, she has made no attempts to get out of bed, bed in lowest position, call light within reach, non-skid socks on. Will continue to monitor patient. Problem: Risk for Impaired Skin Integrity  Goal: Tissue integrity - skin and mucous membranes  Description  Structural intactness and normal physiological function of skin and  mucous membranes. 5/7/2019 1525 by Ladan Leonard RN  Outcome: Ongoing  Note:   Patient has multiple wounds scattered on body, change the dressings with mepilex and Venelex. Will continue to monitor.         Problem: Falls - Risk of:  Goal: Absence of physical injury  Description  Absence of physical injury  Outcome: Ongoing

## 2019-05-07 NOTE — PLAN OF CARE
Problem: Falls - Risk of:  Goal: Will remain free from falls  Description  Will remain free from falls  Outcome: Ongoing  Note:   Patient is bed bound at baseline, has made no attempts to get out of bed, fall precautions in place, bed in lowest position and bed alarm activated, call light in reach. Will continue to monitor. Problem: Risk for Impaired Skin Integrity  Goal: Tissue integrity - skin and mucous membranes  Description  Structural intactness and normal physiological function of skin and  mucous membranes. Outcome: Ongoing  Note:   Pt has multiple wounds and is being seen by wound nurse, venelex ointment applied to wounds, patient checked and changed every two hours and repositioned at this time. Will continue to monitor and assess.

## 2019-05-07 NOTE — CARE COORDINATION
[facility] and  [facility] to follow up     Electronically signed by Wilford Hodgkin, RN on 5/7/2019 at 4:01 PM

## 2019-05-08 VITALS
DIASTOLIC BLOOD PRESSURE: 71 MMHG | TEMPERATURE: 99.3 F | OXYGEN SATURATION: 95 % | BODY MASS INDEX: 16.01 KG/M2 | RESPIRATION RATE: 16 BRPM | SYSTOLIC BLOOD PRESSURE: 131 MMHG | HEIGHT: 66 IN | WEIGHT: 99.65 LBS | HEART RATE: 76 BPM

## 2019-05-08 LAB
ANION GAP SERPL CALCULATED.3IONS-SCNC: 10 MMOL/L (ref 3–16)
BUN BLDV-MCNC: 16 MG/DL (ref 7–20)
CALCIUM SERPL-MCNC: 8.4 MG/DL (ref 8.3–10.6)
CHLORIDE BLD-SCNC: 99 MMOL/L (ref 99–110)
CO2: 27 MMOL/L (ref 21–32)
CREAT SERPL-MCNC: <0.5 MG/DL (ref 0.6–1.2)
EKG ATRIAL RATE: 83 BPM
EKG DIAGNOSIS: NORMAL
EKG P AXIS: 70 DEGREES
EKG P-R INTERVAL: 184 MS
EKG Q-T INTERVAL: 364 MS
EKG QRS DURATION: 100 MS
EKG QTC CALCULATION (BAZETT): 427 MS
EKG R AXIS: -8 DEGREES
EKG T AXIS: 85 DEGREES
EKG VENTRICULAR RATE: 83 BPM
GFR AFRICAN AMERICAN: >60
GFR NON-AFRICAN AMERICAN: >60
GLUCOSE BLD-MCNC: 113 MG/DL (ref 70–99)
HCT VFR BLD CALC: 28.9 % (ref 36–48)
HEMOGLOBIN: 9.6 G/DL (ref 12–16)
MCH RBC QN AUTO: 30.5 PG (ref 26–34)
MCHC RBC AUTO-ENTMCNC: 33.4 G/DL (ref 31–36)
MCV RBC AUTO: 91.3 FL (ref 80–100)
PDW BLD-RTO: 13.5 % (ref 12.4–15.4)
PLATELET # BLD: 294 K/UL (ref 135–450)
PMV BLD AUTO: 8.3 FL (ref 5–10.5)
POTASSIUM SERPL-SCNC: 4.2 MMOL/L (ref 3.5–5.1)
RBC # BLD: 3.17 M/UL (ref 4–5.2)
SODIUM BLD-SCNC: 136 MMOL/L (ref 136–145)
TROPONIN: <0.01 NG/ML
WBC # BLD: 8.3 K/UL (ref 4–11)

## 2019-05-08 PROCEDURE — 6370000000 HC RX 637 (ALT 250 FOR IP): Performed by: FAMILY MEDICINE

## 2019-05-08 PROCEDURE — 93005 ELECTROCARDIOGRAM TRACING: CPT | Performed by: FAMILY MEDICINE

## 2019-05-08 PROCEDURE — 85027 COMPLETE CBC AUTOMATED: CPT

## 2019-05-08 PROCEDURE — 84484 ASSAY OF TROPONIN QUANT: CPT

## 2019-05-08 PROCEDURE — 6360000002 HC RX W HCPCS: Performed by: INTERNAL MEDICINE

## 2019-05-08 PROCEDURE — 6370000000 HC RX 637 (ALT 250 FOR IP): Performed by: INTERNAL MEDICINE

## 2019-05-08 PROCEDURE — 36415 COLL VENOUS BLD VENIPUNCTURE: CPT

## 2019-05-08 PROCEDURE — 80048 BASIC METABOLIC PNL TOTAL CA: CPT

## 2019-05-08 PROCEDURE — 93010 ELECTROCARDIOGRAM REPORT: CPT | Performed by: INTERNAL MEDICINE

## 2019-05-08 RX ORDER — AMOXICILLIN AND CLAVULANATE POTASSIUM 500; 125 MG/1; MG/1
1 TABLET, FILM COATED ORAL EVERY 8 HOURS SCHEDULED
Qty: 30 TABLET | Refills: 0
Start: 2019-05-08 | End: 2019-05-18

## 2019-05-08 RX ORDER — CASTOR OIL AND BALSAM, PERU 788; 87 MG/G; MG/G
OINTMENT TOPICAL 2 TIMES DAILY
Qty: 1 TUBE | Refills: 0
Start: 2019-05-08

## 2019-05-08 RX ADMIN — AMOXICILLIN AND CLAVULANATE POTASSIUM 1 TABLET: 500; 125 TABLET, FILM COATED ORAL at 15:11

## 2019-05-08 RX ADMIN — CASTOR OIL AND BALSAM, PERU: 788; 87 OINTMENT TOPICAL at 09:00

## 2019-05-08 RX ADMIN — AMOXICILLIN AND CLAVULANATE POTASSIUM 1 TABLET: 500; 125 TABLET, FILM COATED ORAL at 05:25

## 2019-05-08 RX ADMIN — ENOXAPARIN SODIUM 30 MG: 30 INJECTION SUBCUTANEOUS at 09:00

## 2019-05-08 RX ADMIN — AMLODIPINE BESYLATE 5 MG: 5 TABLET ORAL at 08:59

## 2019-05-08 ASSESSMENT — PAIN SCALES - GENERAL: PAINLEVEL_OUTOF10: 0

## 2019-05-08 ASSESSMENT — PAIN SCALES - PAIN ASSESSMENT IN ADVANCED DEMENTIA (PAINAD)
FACIALEXPRESSION: 0
NEGVOCALIZATION: 1
TOTALSCORE: 1
CONSOLABILITY: 0
BREATHING: 0
BODYLANGUAGE: 0

## 2019-05-08 NOTE — PROGRESS NOTES
[facility]    5/8/2019 1:23 PM        Name: Juliet Marmolejo . Admitted: 5/5/2019  Primary Care Provider: Yahir Wallace DO (Tel: [phone])      Subjective:      Patient is resting in bed. Nursing reports a rhythm change on tele,  Getting wound care    Reviewed interval ancillary notes    Objective:  BP (!) 141/67   Pulse 98   Temp 97.5 °F (36.4 °C) (Oral)   Resp 18   Ht 5' 6\" (1.676 m)   Wt 99 lb 10.4 oz (45.2 kg)   SpO2 96%   BMI 16.08 kg/m²     Intake/Output Summary (Last 24 hours) at 5/8/2019 1323  Last data filed at 5/8/2019 1016  Gross per 24 hour   Intake 120 ml   Output --   Net 120 ml      Wt Readings from Last 3 Encounters:   05/06/19 99 lb 10.4 oz (45.2 kg)   07/17/12 120 lb (54.4 kg)   08/10/10 107 lb (48.5 kg)       General appearance:  Appears comfortable, no distress  Head:  Atraumatic normocephalic  Neck: supple  Cardiovascular: Regular rhythm, normal S1, S2. No murmur. tr edema in lower extremities  Respiratory: Not using accessory muscles. Good inspiratory effort. Clear to auscultation bilaterally, no wheeze or crackles. GI: Abdomen soft, no tenderness, not distended, normal bowel sounds  Musculoskeletal: Frail and weak. Minimal movement in the bed  Neurology: awake and alert, cooperative      Labs and Tests:  CBC:   Recent Labs     05/05/19  1938 05/06/19  1121   WBC 9.0 8.4   HGB 13.7 10.8*    333     BMP:    Recent Labs     05/05/19  1938 05/06/19  1121   * 133*   K 4.8 4.3   CL 94* 97*   CO2 25 25   BUN 25* 18   CREATININE 0.6 <0.5*   GLUCOSE 117* 107*     Hepatic: No results for input(s): AST, ALT, ALB, BILITOT, ALKPHOS in the last 72 hours. EKG showed sinus with PACs    Assessment & Plan: Active Problems:    Cellulitis    Severe malnutrition (HCC)  Hypertension       Cellulitis appears to be primarily right lower extremity.   There is some swelling noted to that lower extremity. We'll continue with antibiotics. We converted to oral antibiotics today. She has less redness and less swelling to the right ankle and lower leg area. Also encourage her to improve her diet to better improve her nutritional state. Continue with wound care. Pt getting labs and EKG.     Was originally planning on dc today but may hold off dc today to trend trops for now    Palliative to meet with family today    Diet: DIET GENERAL; Dysphagia II Mechanically Altered  Dietary Nutrition Supplements: Standard High Calorie Oral Supplement  Code:Full Code  DVT PPX Lovenox    Edmundo Palumbo MD   5/8/2019 1:23 PM

## 2019-05-08 NOTE — CARE COORDINATION
2019  [facility] Case Management Department    Discharge Summary    Patient: Td Paulino  MRN: [id number] / : 3/31/1927    Emergency Contacts  Healthcare Agent Appointed: Healthcare power of   Healthcare Agent's Name: Derek Gallagher(daughter)    Admission Documentation  Attending Provider: Sanna Marrero MD  Admit date/time: 2019  6:34 PM  Status: Inpatient [101]  Diagnosis: Cellulitis     Readmission within last 30 days:  no     Living Situation:  Discharge Planning  Living Arrangements: Family Members, Children  Support Systems: Children, [employer], Family Members  Potential Assistance Needed: [address]: Hospice, Nursing Services, [place], [place],   Patient expects to be discharged to[de-identified] SNF  Expected Discharge Date: 19    Written Discharge Summary:  SW met with patient and daughters. They are agreeable with plan for discharge to [facility].      Service Assessment :       Values / Beliefs  Do you have any ethnic, cultural, sacramental, or spiritual Yazidism needs you would like us to be aware of while you are in the hospital?: No(Pentecostalism)    Advance Directives (For Healthcare)  Pre-existing DNR Comfort Care/DNR Arrest/DNI Order: No  Healthcare Directive: Yes, patient has an advance directive for healthcare treatment  Type of Healthcare Directive: Durable power of  for health care  Copy in Chart: No, copy requested from family  [address] [place] [address] Agent's Name: Freddie Mccartney)  If you are unable to speak for yourself, does your Healthcare Agent or Legal Spokesperson know your healthcare wishes?: Yes      Pharmacy: Facility to assist.    Potential Assistance Purchasing Medications:  Yes  Does patient want to participate in local refill/meds to beds program?: Yes    Notification for [place] placement completed in [facility]/PAS?: Yes #[id number] Discharge disposition: [address] Phone: [phone] Fax: [phone]       Ancillary: N/A     Patrizia Quintero and her family were provided with choice of provider; she and her family are in agreement with the discharge plan.       Care Transitions patient: LEIA Goodwin  [facility]  Case Management Department  Ph: [phone]

## 2019-05-08 NOTE — CARE COORDINATION
Case Management Daily Note:    Current Plan of Care: Pending Placement       PT AM-PAC: 6/24    Date: 5/7    OT AM-PAC: 9/24   Date: 5/7    DME needs: Defer      Discharge Plan: SNF placement pending referrals [facility] (sent today), 1. [facility], 2, [facility] (said no) 3. [facility])     Tentative Discharge Date: 5/8 vs 5/9    Current Barriers to Discharge: Placement pending     Resources/Information given: SNF choice       Case Management Notes: SW received call from patient's daughter this AM. Nick Knight. She reported 1. [facility], 2. [facility], 3. [facility] (unsure of this referral being sent). Daughter wanted [facility] as well which was sent this AM.     SW and staff to follow.          Miky Gomez, LEIA, LSW     [facility]   [phone]

## 2019-05-08 NOTE — PROGRESS NOTES
Patient's daughter asked who Dr. Dwayne Palma was and explained that he was the palliative care doctor. She voiced her concerns and this writer explained some of the information explained to her and also explained code status which she had questions about. I reminded her that she was to meet with them tomorrow and she could voice questions and concerns with him.

## 2019-05-08 NOTE — DISCHARGE INSTR - COC
Continuity of Care Form    Patient Name: El Das   :  3/31/1927  MRN:  [id number]    Admit date:  2019  Discharge date:  2019    Code Status Order: Full Code   Advance Directives:   [facility] Documentation     Date/Time Healthcare Directive Type of Healthcare Directive Copy in [address] [facility] [address] Agent's Name Healthcare Agent's Phone Number    19 0613  Yes, patient has an advance directive for healthcare treatment  Durable power of  for health care  No, copy requested from family  Healthcare power of   Katia Page daughter  --          Admitting Physician:  Tesfaye Soto MD  PCP: Agustina Duran DO    Discharging Nurse: [facility] Unit/Room#: 5904/0705-39  Discharging Unit Phone Number: [phone]    Emergency Contact:   Extended Emergency Contact Information  Primary Emergency Contact: Dawson Oliva  Address: [address] [place] [address] Phone: [phone]  Relation: Child  Secondary Emergency Contact: brett urias  [place] Phone: [phone]  Relation: Child    Past Surgical History:  Past Surgical History:   Procedure Laterality Date    FRACTURE SURGERY      right hip one week post op    HYSTERECTOMY      UPPER GASTROINTESTINAL ENDOSCOPY  3/8/11       Immunization History:   Immunization History   Administered Date(s) Administered    Influenza Virus Vaccine 10/01/2010    Pneumococcal Polysaccharide (Murhroxqa92) 2011       Active Problems:  Patient Active Problem List   Diagnosis Code    Essential hypertension I10    Low back pain M54.5    DVT (deep venous thrombosis) ([facility]) I82.409    Basal cell carcinoma, arm C44.611    Weakness generalized R53.1    Urinary incontinence R32    Osteoarthritis M19.90    Cellulitis L03.90    Severe malnutrition ([address] E43       Isolation/Infection:   Isolation          No Isolation            Nurse 11:30 PM   Wound Width (cm) 0.5 cm 5/5/2019 11:30 PM   Wound Surface Area (cm^2) 0.25 cm^2 5/5/2019 11:30 PM   Number of days: 2       Wound 05/05/19 Hip Right DTI (Active)   Wound Pressure Stage  2 5/7/2019 11:57 PM   Dressing Status Clean;Dry; Intact 5/7/2019 11:57 PM   Dressing Changed Changed/New 5/7/2019 11:57 PM   Wound Length (cm) 1.5 cm 5/5/2019 11:30 PM   Wound Width (cm) 1.5 cm 5/5/2019 11:30 PM   Wound Surface Area (cm^2) 2.25 cm^2 5/5/2019 11:30 PM   Number of days: 2       Wound 05/05/19 Hip Right (Active)   Wound Deep tissue/Injury 5/7/2019 11:57 PM   Dressing Status Clean;Dry; Intact 5/7/2019 11:57 PM   Dressing Changed Changed/New 5/7/2019 11:57 PM   Wound Length (cm) 4.5 cm 5/5/2019 11:30 PM   Wound Width (cm) 5 cm 5/5/2019 11:30 PM   Wound Surface Area (cm^2) 22.5 cm^2 5/5/2019 11:30 PM   Number of days: 2       Wound 05/05/19 Hip Left (Active)   Wound Deep tissue/Injury 5/7/2019 11:57 PM   Dressing Status Clean;Dry; Intact 5/7/2019 11:57 PM   Dressing Changed Changed/New 5/7/2019 11:57 PM   Wound Length (cm) 10.5 cm 5/5/2019 11:30 PM   Wound Width (cm) 3 cm 5/5/2019 11:30 PM   Wound Surface Area (cm^2) 31.5 cm^2 5/5/2019 11:30 PM   Number of days: 2       Wound 05/05/19 Heel Left (Active)   Wound Pressure Stage  2 5/7/2019 11:57 PM   Dressing Status Clean;Dry; Intact 5/7/2019 11:57 PM   Dressing Changed Changed/New 5/7/2019 11:57 PM   Wound Length (cm) 1 cm 5/5/2019 11:30 PM   Wound Width (cm) 2 cm 5/5/2019 11:30 PM   Wound Surface Area (cm^2) 2 cm^2 5/5/2019 11:30 PM   Number of days: 2       Wound 05/05/19 Hip Left DTI (Active)   Wound Deep tissue/Injury 5/7/2019 11:57 PM   Dressing Status Clean;Dry; Intact 5/7/2019 11:57 PM   Dressing Changed Changed/New 5/7/2019 11:57 PM   Wound Length (cm) 7 cm 5/5/2019 11:30 PM   Wound Width (cm) 7 cm 5/5/2019 11:30 PM   Wound Surface Area (cm^2) 49 cm^2 5/5/2019 11:30 PM   Number of days: 2       Wound 05/05/19 Heel Right (Active)   Wound Pressure Stage  2 5/7/2019 11:57 PM   Dressing Status Clean;Dry; Intact 5/7/2019 11:57 PM   Dressing Changed Changed/New 5/7/2019 11:57 PM   Number of days: 2       Wound 05/05/19 Foot Left (Active)   Wound Deep tissue/Injury 5/7/2019 11:57 PM   Dressing Status Clean;Dry; Intact 5/7/2019 11:57 PM   Dressing Changed Changed/New 5/7/2019 11:57 PM   Number of days: 2       Wound 05/05/19 Leg Lower; Left (Active)   Wound Deep tissue/Injury 5/7/2019 11:57 PM   Dressing Status Clean;Dry; Intact 5/7/2019 11:57 PM   Dressing Changed Changed/New 5/7/2019 11:57 PM   Number of days: 2       Wound 05/05/19 Back Right (Active)   Wound Deep tissue/Injury 5/7/2019 11:57 PM   Dressing Status Clean;Dry; Intact 5/7/2019 11:57 PM   Dressing Changed Changed/New 5/7/2019 11:57 PM   Wound Length (cm) 10 cm 5/5/2019 11:30 PM   Wound Width (cm) 9 cm 5/5/2019 11:30 PM   Wound Surface Area (cm^2) 90 cm^2 5/5/2019 11:30 PM   Drainage Amount None 5/6/2019  5:00 AM   Number of days: 2       Wound 05/06/19 Ankle Right;Lateral DTI (Active)   Wound Deep tissue/Injury 5/7/2019 11:57 PM   Dressing Status Clean;Dry; Intact 5/7/2019 11:57 PM   Dressing Changed Changed/New 5/7/2019 11:57 PM   Number of days: 2       Wound 05/05/19 Knee Lateral;Left (Active)   Wound Deep tissue/Injury 5/7/2019 11:57 PM   Dressing Status Clean;Dry; Intact 5/7/2019 11:57 PM   Dressing Changed Changed/New 5/7/2019 11:57 PM   Number of days: 2       Wound 05/05/19 Ankle Right;Lateral (Active)   Wound Deep tissue/Injury 5/7/2019 11:57 PM   Dressing Status Clean;Dry; Intact 5/7/2019 11:57 PM   Dressing Changed Changed/New 5/7/2019 11:57 PM   Number of days: 2       Wound 05/05/19 Knee Inner;Right (Active)   Wound Deep tissue/Injury 5/7/2019 11:57 PM   Dressing Status Clean;Dry; Intact 5/7/2019 11:57 PM   Dressing Changed Changed/New 5/7/2019 11:57 PM   Number of days: 2       Wound 05/05/19 Leg Inner;Right (Active)   Wound Deep tissue/Injury 5/7/2019 11:57 PM   Dressing Status Clean;Dry; Intact 5/7/2019 [address] [place] [address] at 4:30 pm via BLS. / signature: Electronically signed by LEIA Brothers on 5/8/19 at 2:50 PM    PHYSICIAN SECTION    Prognosis: Fair    Condition at Discharge: Stable    Rehab Potential (if transferring to Rehab): Fair    Recommended Labs or Other Treatments After Discharge: PT/OT and wound care    Physician Certification: I certify the above information and transfer of Mitchell Dawn  is necessary for the continuing treatment of the diagnosis listed and that she requires [facility] for less 30 days.      Update Admission H&P: No change in H&P    PHYSICIAN SIGNATURE:  Electronically signed by Aidan Flores MD on 5/8/19 at 9:04 AM

## 2019-05-08 NOTE — PLAN OF CARE
Problem: Falls - Risk of:  Goal: Will remain free from falls  Description  Will remain free from falls  5/8/2019 0210 by Trevor Acevedo RN  Outcome: Ongoing  Note:   Pt is a fall risk and bed bound at baseline. Fall precautions in place, bed in lowest position, alarm activated, and call light in reach. Pt has made no attempts to get out of bed this shift. Will continue to monitor. 5/7/2019 1525 by Emilio Lawrence RN  Outcome: Ongoing  Note:   Patient is bed bound at baseline, she has made no attempts to get out of bed, bed in lowest position, call light within reach, non-skid socks on. Will continue to monitor patient. Problem: Risk for Impaired Skin Integrity  Goal: Tissue integrity - skin and mucous membranes  Description  Structural intactness and normal physiological function of skin and  mucous membranes. 5/8/2019 0210 by Trevor Acevedo RN  Outcome: Ongoing  Note:   Pt has multiple wounds that was treated and changed by this nurse, on a specialty mattress and is being checked and changed every two hour, pt is also being repositioned at these times.

## 2019-05-08 NOTE — CARE COORDINATION
SHIREEN received call from Patient daughter Denver Hammock and spoke with other daughter at bedside. After touring, Family was agreeable to placement at [facility]. [phone][facility] Transport scheduled for 4:30 pm.     SHIREEN placed call to [facility] ([phone]). They could accept and transport time was okay. SHIREEN informed patient and daughters at bedside. SHIREEN updated [facility], Bedside RN, and Dr. Sadiq Quijano.        [facility] [address], [place],  LEIA Arizmendi, [facility]     [facility]   [phone]
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
## 2019-05-08 NOTE — DISCHARGE INSTR - DIET

## 2019-05-10 LAB — BLOOD CULTURE, ROUTINE: NORMAL
